# Patient Record
Sex: MALE | Race: WHITE | URBAN - METROPOLITAN AREA
[De-identification: names, ages, dates, MRNs, and addresses within clinical notes are randomized per-mention and may not be internally consistent; named-entity substitution may affect disease eponyms.]

---

## 2022-07-13 ENCOUNTER — OFFICE VISIT (OUTPATIENT)
Dept: URBAN - METROPOLITAN AREA CLINIC 6 | Facility: CLINIC | Age: 77
End: 2022-07-13
Payer: MEDICARE

## 2022-07-13 DIAGNOSIS — H01.005 UNSPECIFIED BLEPHARITIS OF LEFT LOWER EYELID: ICD-10-CM

## 2022-07-13 DIAGNOSIS — H18.593 OTHER HEREDITARY CORNEAL DYSTROPHIES, BILATERAL: ICD-10-CM

## 2022-07-13 DIAGNOSIS — H01.004 UNSPECIFIED BLEPHARITIS OF LEFT UPPER EYELID: ICD-10-CM

## 2022-07-13 DIAGNOSIS — H01.002 UNSPECIFIED BLEPHARITIS OF RIGHT LOWER EYELID: ICD-10-CM

## 2022-07-13 DIAGNOSIS — H01.001 UNSPECIFIED BLEPARITIS OF RIGHT UPPER EYELID: Primary | ICD-10-CM

## 2022-07-13 PROCEDURE — 92012 INTRM OPH EXAM EST PATIENT: CPT | Performed by: OPTOMETRIST

## 2022-07-13 RX ORDER — NEOMYCIN SULFATE, POLYMYXIN B SULFATE AND DEXAMETHASONE 3.5; 10000; 1 MG/G; [USP'U]/G; MG/G
OINTMENT OPHTHALMIC
Qty: 3.5 | Refills: 0 | Status: ACTIVE
Start: 2022-07-13

## 2022-07-13 ASSESSMENT — INTRAOCULAR PRESSURE
OS: 14
OD: 17

## 2022-07-13 NOTE — IMPRESSION/PLAN
Impression: Unspecified bleparitis of right upper eyelid: H01.001. 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 Start lid hygiene and warm compresses daily. Discussed with the patient benefits of flaxseed oil or omega 3 supplements. Instructions given. Start Maxitrol bee BID OU x 1 week then stop.

## 2022-07-13 NOTE — IMPRESSION/PLAN
Impression: Other hereditary corneal dystrophies, bilateral: H18.593. Plan: Anterior basement membrane dystrophy both eyes: monitor.